# Patient Record
Sex: FEMALE | Race: AMERICAN INDIAN OR ALASKA NATIVE
[De-identification: names, ages, dates, MRNs, and addresses within clinical notes are randomized per-mention and may not be internally consistent; named-entity substitution may affect disease eponyms.]

---

## 2019-09-14 ENCOUNTER — HOSPITAL ENCOUNTER (EMERGENCY)
Dept: HOSPITAL 5 - ED | Age: 29
Discharge: HOME | End: 2019-09-14
Payer: SELF-PAY

## 2019-09-14 VITALS — SYSTOLIC BLOOD PRESSURE: 115 MMHG | DIASTOLIC BLOOD PRESSURE: 80 MMHG

## 2019-09-14 DIAGNOSIS — K76.0: ICD-10-CM

## 2019-09-14 DIAGNOSIS — K52.9: Primary | ICD-10-CM

## 2019-09-14 DIAGNOSIS — F10.129: ICD-10-CM

## 2019-09-14 LAB
ALBUMIN SERPL-MCNC: 3.9 G/DL (ref 3.9–5)
ALT SERPL-CCNC: 51 UNITS/L (ref 7–56)
BACTERIA #/AREA URNS HPF: (no result) /HPF
BASOPHILS # (AUTO): 0 K/MM3 (ref 0–0.1)
BASOPHILS NFR BLD AUTO: 0.7 % (ref 0–1.8)
BILIRUB UR QL STRIP: (no result)
BLOOD UR QL VISUAL: (no result)
BUN SERPL-MCNC: 8 MG/DL (ref 7–17)
BUN/CREAT SERPL: 20 %
CALCIUM SERPL-MCNC: 9.3 MG/DL (ref 8.4–10.2)
EOSINOPHIL # BLD AUTO: 0 K/MM3 (ref 0–0.4)
EOSINOPHIL NFR BLD AUTO: 0.9 % (ref 0–4.3)
HCT VFR BLD CALC: 37.9 % (ref 30.3–42.9)
HEMOLYSIS INDEX: 5
HGB BLD-MCNC: 12.3 GM/DL (ref 10.1–14.3)
LYMPHOCYTES # BLD AUTO: 1.5 K/MM3 (ref 1.2–5.4)
LYMPHOCYTES NFR BLD AUTO: 42.6 % (ref 13.4–35)
MCHC RBC AUTO-ENTMCNC: 32 % (ref 30–34)
MCV RBC AUTO: 79 FL (ref 79–97)
MONOCYTES # (AUTO): 0.4 K/MM3 (ref 0–0.8)
MONOCYTES % (AUTO): 10.6 % (ref 0–7.3)
MUCOUS THREADS #/AREA URNS HPF: (no result) /HPF
PH UR STRIP: 5 [PH] (ref 5–7)
PLATELET # BLD: 139 K/MM3 (ref 140–440)
RBC # BLD AUTO: 4.8 M/MM3 (ref 3.65–5.03)
RBC #/AREA URNS HPF: 6 /HPF (ref 0–6)
UROBILINOGEN UR-MCNC: 4 MG/DL (ref ?–2)
WBC #/AREA URNS HPF: 23 /HPF (ref 0–6)

## 2019-09-14 PROCEDURE — 74177 CT ABD & PELVIS W/CONTRAST: CPT

## 2019-09-14 PROCEDURE — 87086 URINE CULTURE/COLONY COUNT: CPT

## 2019-09-14 PROCEDURE — 96374 THER/PROPH/DIAG INJ IV PUSH: CPT

## 2019-09-14 PROCEDURE — 80074 ACUTE HEPATITIS PANEL: CPT

## 2019-09-14 PROCEDURE — 84703 CHORIONIC GONADOTROPIN ASSAY: CPT

## 2019-09-14 PROCEDURE — 85025 COMPLETE CBC W/AUTO DIFF WBC: CPT

## 2019-09-14 PROCEDURE — 80053 COMPREHEN METABOLIC PANEL: CPT

## 2019-09-14 PROCEDURE — 81001 URINALYSIS AUTO W/SCOPE: CPT

## 2019-09-14 PROCEDURE — 83690 ASSAY OF LIPASE: CPT

## 2019-09-14 PROCEDURE — 99284 EMERGENCY DEPT VISIT MOD MDM: CPT

## 2019-09-14 PROCEDURE — 96361 HYDRATE IV INFUSION ADD-ON: CPT

## 2019-09-14 PROCEDURE — 36415 COLL VENOUS BLD VENIPUNCTURE: CPT

## 2019-09-14 NOTE — CAT SCAN REPORT
CT ABDOMEN AND PELVIS WITH CONTRAST



HISTORY: MAIN: right sided abdominal pain. 100 ML OMNIAPQUE 300.



COMPARISON: None.



TECHNIQUE: CT images of the abdomen and pelvis were obtained following administration of intravenous 
contrast.  All CT scans at this location are performed using CT dose reduction for ALARA by means of 
automated exposure control. 



CONTRAST: 100 ml of intravenous contrast administered.



FINDINGS:



Lungs/bones:  Lung bases are clear. There is no acute osseous abnormality.



Abdomen/pelvis:  There is severe hepatic steatosis and there is hepatomegaly. The gallbladder, spleen
, pancreas, adrenals, kidneys, and proximal GI tract appear unremarkable.



Urinary bladder is partially collapsed but otherwise unremarkable. Uterus appears slightly edematous 
and there is a simple left ovarian cyst measuring approximately 1.6 cm. There is trace simple pelvic 
free fluid is well. There is circumferential wall thickening and inflammatory stranding along the fuentes
cending colon. Appendix is normal.



IMPRESSION:

1. Descending colitis. No bowel obstruction or perforation.

2. Edematous appearance of the uterus and simple left ovarian cyst--correlate with stage in menstrual
 cycle.



Signer Name: Karl Saleh MD 

Signed: 9/14/2019 7:38 PM

 Workstation Name: [id number]

## 2019-09-14 NOTE — EMERGENCY DEPARTMENT REPORT
<GATITO KAMARA - Last Filed: 19 22:15>





ED Abdominal Pain HPI





- General


Chief Complaint: Abdominal Pain


Stated Complaint: VOMIT/STOMACH PAIN/LIVER PAIN


Time Seen by Provider: 19 15:54





- Related Data


                                  Previous Rx's











 Medication  Instructions  Recorded  Last Taken  Type


 


Ciprofloxacin HCl [Ciprofloxacin 500 mg PO Q12HR 3 Days #6 tab 19 Unknown 

Rx





TAB]    











                                    Allergies











Allergy/AdvReac Type Severity Reaction Status Date / Time


 


No Known Allergies Allergy   Verified 19 15:53














ED Past Medical Hx





- Medications


Home Medications: 


                                Home Medications











 Medication  Instructions  Recorded  Confirmed  Last Taken  Type


 


Ciprofloxacin HCl [Ciprofloxacin 500 mg PO Q12HR 3 Days #6 tab 19  Unknown

 Rx





TAB]     














ED Course





- Consultations


Consultation #1: 





19 22:15


Case discussed with on-call GI doctor H regarding patient's symptoms and CT 

findings.  Suggest that results could be due to chronic alcohol abuse as well 

versus inherited liver disorder.  We will inquired further about patient's 

alcohol intake history.  He recommends Cipro for 3-5 days and outpatient follow-

up. Karen villar.





ED Medical Decision Making





- Lab Data


Result diagrams: 


                                 19 16:28





                                 19 16:28





ED Disposition


Clinical Impression: 


 Colitis, Hepatic steatosis, Hepatomegaly, Alcohol abuse





Disposition: DC-01 TO HOME OR SELFCARE


Condition: Stable


Instructions:  Abdominal Pain (ED), Cirrhosis (ED), Infectious Colitis (ED)


Prescriptions: 


Ciprofloxacin HCl [Ciprofloxacin TAB] 500 mg PO Q12HR 3 Days #6 tab


Referrals: 


[facility],MD [Primary Care Provider] - 3-5 Days


[facility] [Provider Group] - 3-5 Days





<ANUJ ALLEN - Last Filed: 19 22:25>





ED Abdominal Pain HPI





- General


Source: patient


Mode of arrival: Ambulatory


Limitations: No Limitations





- History of Present Illness


Initial Comments: 





28-year-old -American female presents to the emergency room complaining 

of abdominal pain, nausea vomiting diarrhea 2 months with worsening symptoms in

the last 2 days.  Patient reports she is currently being evaluated by her 

primary care provider for liver disease.  Patient reports she's had 25 pound 

weight loss in the last 2-1/2 months.  Patient reports that her vomitus is green

and her diarrhea is green.  She vomited 3 times today with one loose stool.  She

is  0 para 0 currently takes no medications has no past medical history 

no known drug allergies.  She is followed by [facility] on [facility].  Patient denies any fever no chills.


MD Complaint: abdominal pain


Onset/Timin


-: month(s)


Location: RUQ


Radiation: none


Migration to: no migration


Severity: moderate


Severity scale (0 -10): 5


Quality: cramping, stabbing


Consistency: intermittent


Improves With: nothing


Worsens With: nothing


Associated Symptoms: nausea, vomiting, diarrhea





- Related Data


LMP Date: 19





ED Review of Systems


ROS: 


Stated complaint: VOMIT/STOMACH PAIN/LIVER PAIN


Other details as noted in HPI





Comment: All other systems reviewed and negative


Constitutional: denies: chills, fever


ENT: denies: ear pain, throat pain


Respiratory: denies: cough, shortness of breath, wheezing


Cardiovascular: denies: chest pain, palpitations


Endocrine: no symptoms reported


Gastrointestinal: abdominal pain, nausea, vomiting, diarrhea


Genitourinary: denies: urgency, dysuria, discharge


Musculoskeletal: denies: back pain, joint swelling, arthralgia


Skin: denies: rash, lesions


Neurological: denies: headache, weakness, paresthesias


Psychiatric: denies: anxiety, depression


Hematological/Lymphatic: denies: easy bleeding, easy bruising





ED Past Medical Hx





- Past Medical History


Previous Medical History?: No





- Surgical History


Past Surgical History?: No





- Social History


Smoking Status: Current Every Day Smoker


Substance Use Type: Alcohol





ED Physical Exam





- General


Limitations: No Limitations


General appearance: alert, in no apparent distress, cachectic





- Head


Head exam: Present: atraumatic, normocephalic





- Eye


Eye exam: Present: normal appearance, PERRL





- ENT


ENT exam: Present: normal orophraynx, mucous membranes moist





- Neck


Neck exam: Present: normal inspection, full ROM.  Absent: tenderness, 

lymphadenopathy





- Respiratory


Respiratory exam: Present: normal lung sounds bilaterally.  Absent: respiratory 

distress





- Cardiovascular


Cardiovascular Exam: Present: regular rate, normal rhythm.  Absent: systolic 

murmur, diastolic murmur, rubs, gallop





- GI/Abdominal


GI/Abdominal exam: Present: soft, tenderness (ruq).  Absent: distended





- Back Exam


Back exam: Present: normal inspection





- Neurological Exam


Neurological exam: Present: alert, oriented X3





- Psychiatric


Psychiatric exam: Present: normal affect, normal mood





- Skin


Skin exam: Present: warm, dry, intact, normal color.  Absent: rash





ED Course


                                   Vital Signs











  19





  15:53


 


Temperature 97.5 F L


 


Pulse Rate 85


 


Respiratory 16





Rate 


 


Blood Pressure 115/80


 


O2 Sat by Pulse 100





Oximetry 














ED Medical Decision Making





- Lab Data


Result diagrams: 


                                 19 16:28





                                 19 16:28





- Radiology Data


Radiology results: report reviewed





Patient: LUIS PORTER MR#: 


[id number]


: 1990 Acct:[id number]





Age/Sex: 28 / F ADM Date: 19





Loc: ED


Attending Dr:








Ordering Physician: HUMBLE BUCKLEY


Date of Service: 19


Procedure(s): CT abdomen pelvis w con


Accession Number(s): [id number]





cc: HUMBLE BUCKLEY








CT ABDOMEN AND PELVIS WITH CONTRAST





HISTORY: MAIN: right sided abdominal pain. 100 ML OMNIAPQUE 300.





COMPARISON: None.





TECHNIQUE: CT images of the abdomen and pelvis were obtained following 

administration of


intravenous contrast. All CT scans at this location are performed using CT dose 

reduction for ALARA


by means of automated exposure control.





CONTRAST: 100 ml of intravenous contrast administered.





FINDINGS:





Lungs/bones: Lung bases are clear. There is no acute osseous abnormality.





Abdomen/pelvis: There is severe hepatic steatosis and there is hepatomegaly. The

gallbladder,


spleen, pancreas, adrenals, kidneys, and proximal GI tract appear unremarkable.





Urinary bladder is partially collapsed but otherwise unremarkable. Uterus 

appears slightly


edematous and there is a simple left ovarian cyst measuring approximately 1.6 

cm. There is trace


simple pelvic free fluid is well. There is circumferential wall thickening and 

inflammatory


stranding along the descending colon. Appendix is normal.





IMPRESSION:


1. Descending colitis. No bowel obstruction or perforation.


2. Edematous appearance of the uterus and simple left ovarian cyst--correlate 

with stage in


menstrual cycle.





Signer Name: Karl Saleh MD


Signed: 2019 7:38 PM


Workstation Name: [id number]








Transcribed By: JETHRO


Dictated By: Karl Saleh MD


Electronically Authenticated By: Karl Saleh MD


Signed Date/Time: 19











DD/DT: 19


TD/TT:





- Medical Decision Making





28-year-old -American female presents to the emergency room complaining 

of abdominal pain, nausea vomiting diarrhea 2 months with worsening symptoms in

the last 2 days.  Patient reports she is currently being evaluated by her 

primary care provider for liver disease.  Patient reports she's had 25 pound 

weight loss in the last 2-1/2 months.  Patient reports that her vomitus is green

and her diarrhea is green.  She vomited 3 times today with one loose stool.  She

is  0 para 0 currently takes no medications has no past medical history 

no known drug allergies.  She is followed by [facility] on [facility].  Patient denies any fever no chills.





Interviewing patient she admits to being a heavy drinker but has cutback.  

Patient denies any depression suicidal or homicidal ideation.  Patient is with 

her mother who seems to be very supportive.  The patient her labs and CT 

results.  Discussed the patient we had consulted gastroenterology and they r

ecommended Cipro 500 mg twice a day for 3 days and to follow-up with them.  

Patient verbalized understanding


Critical care attestation.: 


If time is entered above; I have spent that time in minutes in the direct care 

of this critically ill patient, excluding procedure time.








ED Disposition


Is pt being admited?: No

## 2019-09-14 NOTE — EVENT NOTE
ED Screening Note


Date of service: 09/14/19


Time: 15:54


ED Screening Note: 





This is a 28 y.o. F. that presents to the ER with abdominal, nausea, and 

vomiting x 2 months.





Reports worsening symptoms for 2 days.





LMP 09/05/2019





Patient went to PCP at [facility] 3 weeks ago with copy of pelvic US and 

labs





This initial assessment/diagnostic orders/clinical plan/treatment(s) is/are 

subject to change based on patients health status, clinical progression and re-

assessment by fellow clinical providers in the ED. Further treatment and workup 

at subsequent clinical providers discretion. Patient/guardian urged not to elope

from the ED as their condition may be serious if not clinically assessed and 

managed. 





Initial orders include: 





Labs and CT of abdomen and pelvis.

## 2020-02-07 ENCOUNTER — HOSPITAL ENCOUNTER (EMERGENCY)
Dept: HOSPITAL 5 - ED | Age: 30
LOS: 1 days | Discharge: HOME | End: 2020-02-08
Payer: SELF-PAY

## 2020-02-07 DIAGNOSIS — Z79.899: ICD-10-CM

## 2020-02-07 DIAGNOSIS — N39.0: ICD-10-CM

## 2020-02-07 DIAGNOSIS — F17.200: ICD-10-CM

## 2020-02-07 DIAGNOSIS — F12.10: ICD-10-CM

## 2020-02-07 DIAGNOSIS — F10.10: ICD-10-CM

## 2020-02-07 DIAGNOSIS — N93.9: Primary | ICD-10-CM

## 2020-02-07 PROCEDURE — 36415 COLL VENOUS BLD VENIPUNCTURE: CPT

## 2020-02-07 PROCEDURE — 86901 BLOOD TYPING SEROLOGIC RH(D): CPT

## 2020-02-07 PROCEDURE — 81001 URINALYSIS AUTO W/SCOPE: CPT

## 2020-02-07 PROCEDURE — 86900 BLOOD TYPING SEROLOGIC ABO: CPT

## 2020-02-07 PROCEDURE — 84702 CHORIONIC GONADOTROPIN TEST: CPT

## 2020-02-07 PROCEDURE — 85025 COMPLETE CBC W/AUTO DIFF WBC: CPT

## 2020-02-07 PROCEDURE — 84703 CHORIONIC GONADOTROPIN ASSAY: CPT

## 2020-02-08 VITALS — SYSTOLIC BLOOD PRESSURE: 140 MMHG | DIASTOLIC BLOOD PRESSURE: 80 MMHG

## 2020-02-08 LAB
BASOPHILS # (AUTO): 0 K/MM3 (ref 0–0.1)
BASOPHILS NFR BLD AUTO: 0.8 % (ref 0–1.8)
BILIRUB UR QL STRIP: (no result)
BLOOD UR QL VISUAL: (no result)
CAOX CRY #/AREA URNS HPF: (no result) /[HPF]
EOSINOPHIL # BLD AUTO: 0.1 K/MM3 (ref 0–0.4)
EOSINOPHIL NFR BLD AUTO: 1.8 % (ref 0–4.3)
HCT VFR BLD CALC: 31 % (ref 30.3–42.9)
HGB BLD-MCNC: 9.7 GM/DL (ref 10.1–14.3)
LYMPHOCYTES # BLD AUTO: 2.2 K/MM3 (ref 1.2–5.4)
LYMPHOCYTES NFR BLD AUTO: 52.5 % (ref 13.4–35)
MCHC RBC AUTO-ENTMCNC: 31 % (ref 30–34)
MCV RBC AUTO: 79 FL (ref 79–97)
MONOCYTES # (AUTO): 0.4 K/MM3 (ref 0–0.8)
MONOCYTES % (AUTO): 9.7 % (ref 0–7.3)
MUCOUS THREADS #/AREA URNS HPF: (no result) /HPF
PH UR STRIP: 6 [PH] (ref 5–7)
PLATELET # BLD: 215 K/MM3 (ref 140–440)
PROT UR STRIP-MCNC: (no result) MG/DL
RBC # BLD AUTO: 3.91 M/MM3 (ref 3.65–5.03)
RBC #/AREA URNS HPF: 4 /HPF (ref 0–6)
UROBILINOGEN UR-MCNC: < 2 MG/DL (ref ?–2)
WBC #/AREA URNS HPF: 3 /HPF (ref 0–6)

## 2020-02-08 NOTE — EMERGENCY DEPARTMENT REPORT
ED Female  HPI





- General


Chief complaint: Vaginal Bleeding


Stated complaint: ABD PAIN/BLEEDING


Time Seen by Provider: 02/08/20 01:49


Source: patient


Mode of arrival: Ambulatory


Limitations: No Limitations





- History of Present Illness


Initial comments: 





29-year-old -American female presents to the emergency room for vaginal 

bleeding 3 weeks.  Patient states sometimes she spots entire chest clots.  

Patient states she uses tampons and pads.  Patient's last known menstrual period

was 12/01/2019.  Patient does report intermittent pelvic cramping.  Patient has 

no past medical history currently takes no medications on a daily basis and has 

no known drug allergies.


MD Complaint: vaginal bleeding


Onset/Timing: 3


-: week(s)


Radiation: suprapubic


Severity: mild


Quality: cramping


Consistency: intermittent


Worsens with: none


Are you Pregnant Now?: No


Last Menstrual Period: 12/01/19


EDC: 09/06/20


Associated Symptoms: vaginal bleeding.  denies: vaginal discharge, abdominal 

pain, nausea/vomiting, fever/chills





- Related Data


                                  Previous Rx's











 Medication  Instructions  Recorded  Last Taken  Type


 


Ciprofloxacin HCl [Ciprofloxacin 500 mg PO Q12HR 3 Days #6 tab 09/14/19 Unknown 

Rx





TAB]    


 


Nitrofurantoin Mono/M-Cryst 100 mg PO Q12HR 7 Days #14 capsule 02/08/20 Unknown 

Rx





[Macrobid CAP]    


 


medroxyPROGESTERone ACETATE 10 mg PO QDAY 10 Days #10 tablet 02/08/20 Unknown Rx





[Provera]    











                                    Allergies











Allergy/AdvReac Type Severity Reaction Status Date / Time


 


No Known Allergies Allergy   Verified 09/14/19 15:53














ED Review of Systems


ROS: 


Stated complaint: ABD PAIN/BLEEDING


Other details as noted in HPI








ED Past Medical Hx





- Past Medical History


Previous Medical History?: No





- Surgical History


Past Surgical History?: No





- Social History


Smoking Status: Current Every Day Smoker


Substance Use Type: Alcohol, Marijuana





- Medications


Home Medications: 


                                Home Medications











 Medication  Instructions  Recorded  Confirmed  Last Taken  Type


 


Ciprofloxacin HCl [Ciprofloxacin 500 mg PO Q12HR 3 Days #6 tab 09/14/19  Unknown

 Rx





TAB]     


 


Nitrofurantoin Mono/M-Cryst 100 mg PO Q12HR 7 Days #14 capsule 02/08/20  Unknown

Rx





[Macrobid CAP]     


 


medroxyPROGESTERone ACETATE 10 mg PO QDAY 10 Days #10 tablet 02/08/20  Unknown 

Rx





[Provera]     














ED Physical Exam





- General


Limitations: No Limitations


General appearance: alert, in no apparent distress





- Head


Head exam: Present: atraumatic, normocephalic





- Eye


Eye exam: Present: normal appearance





- ENT


ENT exam: Present: mucous membranes moist





- Neck


Neck exam: Present: normal inspection





- Respiratory


Respiratory exam: Present: normal lung sounds bilaterally.  Absent: respiratory 

distress





- Cardiovascular


Cardiovascular Exam: Present: regular rate, normal rhythm.  Absent: systolic 

murmur, diastolic murmur, rubs, gallop





- GI/Abdominal


GI/Abdominal exam: Present: soft, normal bowel sounds





- Extremities Exam


Extremities exam: Present: normal inspection





- Back Exam


Back exam: Present: normal inspection





- Neurological Exam


Neurological exam: Present: alert, oriented X3





- Psychiatric


Psychiatric exam: Present: normal affect, normal mood





- Skin


Skin exam: Present: warm, dry, intact, normal color.  Absent: rash





ED Course





                                   Vital Signs











  02/07/20





  22:15


 


Temperature 97.7 F


 


Pulse Rate 92 H


 


Respiratory 13





Rate 


 


Blood Pressure 146/99


 


O2 Sat by Pulse 100





Oximetry 














ED Medical Decision Making





- Lab Data


Result diagrams: 


                                 02/08/20 00:27








- Medical Decision Making





29-year-old -American female presents to the emergency room for vaginal 

bleeding 3 weeks.  Patient states sometimes she spots entire chest clots.  

Patient states she uses tampons and pads.  Patient's last known menstrual period

 was 12/01/2019.  Patient does report intermittent pelvic cramping.  Patient has

 no past medical history currently takes no medications on a daily basis and has

 no known drug allergies.











Patient has positive nitrates in her urine will be placed on Macrobid.  Patient 

be placed on Provera for 10 days for dysfunctional uterine bleeding.  Patient be

 referred to OB/GYN.  Patient has a stable CBC and CMP.


Critical care attestation.: 


If time is entered above; I have spent that time in minutes in the direct care 

of this critically ill patient, excluding procedure time.








ED Disposition


Clinical Impression: 


 Dysfunctional or functional uterine hemorrhage, Urinary tract infection





Disposition: DC-01 TO HOME OR SELFCARE


Is pt being admited?: No


Does the pt Need Aspirin: No


Condition: Stable


Instructions:  Dysfunctional Uterine Bleeding (ED), Urinary Tract Infection in 

Women (ED)


Additional Instructions: 


Follow-up with the OB/GYN provider.


Prescriptions: 


Nitrofurantoin Mono/M-Cryst [Macrobid CAP] 100 mg PO Q12HR 7 Days #14 capsule


medroxyPROGESTERone ACETATE [Provera] 10 mg PO QDAY 10 Days #10 tablet


Referrals: 


[facility] [Primary Care Provider] - 3-5 Days


[facility] [Provider Group] - 3-5 Days